# Patient Record
Sex: FEMALE | Race: WHITE | Employment: FULL TIME | URBAN - METROPOLITAN AREA
[De-identification: names, ages, dates, MRNs, and addresses within clinical notes are randomized per-mention and may not be internally consistent; named-entity substitution may affect disease eponyms.]

---

## 2017-08-24 ENCOUNTER — HOSPITAL ENCOUNTER (OUTPATIENT)
Dept: GENERAL RADIOLOGY | Age: 57
Discharge: HOME | End: 2017-08-24
Payer: COMMERCIAL

## 2017-08-24 ENCOUNTER — HOSPITAL ENCOUNTER (OUTPATIENT)
Dept: PREADMISSION TESTING | Age: 57
Discharge: HOME | End: 2017-08-24
Payer: COMMERCIAL

## 2017-08-24 VITALS — BODY MASS INDEX: 25.58 KG/M2 | WEIGHT: 139 LBS | HEIGHT: 62 IN

## 2017-08-24 LAB
ANION GAP SERPL CALC-SCNC: 8 MMOL/L (ref 3–18)
ATRIAL RATE: 65 BPM
BUN SERPL-MCNC: 13 MG/DL (ref 7–18)
BUN/CREAT SERPL: 23 (ref 12–20)
CALCIUM SERPL-MCNC: 8.7 MG/DL (ref 8.5–10.1)
CALCULATED P AXIS, ECG09: 64 DEGREES
CALCULATED R AXIS, ECG10: 40 DEGREES
CALCULATED T AXIS, ECG11: 58 DEGREES
CHLORIDE SERPL-SCNC: 105 MMOL/L (ref 100–108)
CO2 SERPL-SCNC: 29 MMOL/L (ref 21–32)
CREAT SERPL-MCNC: 0.56 MG/DL (ref 0.6–1.3)
DIAGNOSIS, 93000: NORMAL
ERYTHROCYTE [DISTWIDTH] IN BLOOD BY AUTOMATED COUNT: 13.1 % (ref 11.6–14.5)
GLUCOSE SERPL-MCNC: 87 MG/DL (ref 74–99)
HCT VFR BLD AUTO: 41.1 % (ref 35–45)
HGB BLD-MCNC: 13.5 G/DL (ref 12–16)
MCH RBC QN AUTO: 30.4 PG (ref 24–34)
MCHC RBC AUTO-ENTMCNC: 32.8 G/DL (ref 31–37)
MCV RBC AUTO: 92.6 FL (ref 74–97)
P-R INTERVAL, ECG05: 152 MS
PLATELET # BLD AUTO: 284 K/UL (ref 135–420)
PMV BLD AUTO: 8.7 FL (ref 9.2–11.8)
POTASSIUM SERPL-SCNC: 4.1 MMOL/L (ref 3.5–5.5)
Q-T INTERVAL, ECG07: 410 MS
QRS DURATION, ECG06: 74 MS
QTC CALCULATION (BEZET), ECG08: 426 MS
RBC # BLD AUTO: 4.44 M/UL (ref 4.2–5.3)
SODIUM SERPL-SCNC: 142 MMOL/L (ref 136–145)
VENTRICULAR RATE, ECG03: 65 BPM
WBC # BLD AUTO: 9 K/UL (ref 4.6–13.2)

## 2017-08-24 PROCEDURE — 80048 BASIC METABOLIC PNL TOTAL CA: CPT | Performed by: UROLOGY

## 2017-08-24 PROCEDURE — 36415 COLL VENOUS BLD VENIPUNCTURE: CPT | Performed by: UROLOGY

## 2017-08-24 PROCEDURE — 93005 ELECTROCARDIOGRAM TRACING: CPT

## 2017-08-24 PROCEDURE — 71010 XR CHEST SNGL V: CPT

## 2017-08-24 PROCEDURE — 85027 COMPLETE CBC AUTOMATED: CPT | Performed by: UROLOGY

## 2017-08-24 RX ORDER — CEFAZOLIN SODIUM 2 G/50ML
2 SOLUTION INTRAVENOUS ONCE
Status: CANCELLED | OUTPATIENT
Start: 2017-08-24 | End: 2017-08-24

## 2017-08-24 RX ORDER — SODIUM CHLORIDE, SODIUM LACTATE, POTASSIUM CHLORIDE, CALCIUM CHLORIDE 600; 310; 30; 20 MG/100ML; MG/100ML; MG/100ML; MG/100ML
125 INJECTION, SOLUTION INTRAVENOUS CONTINUOUS
Status: CANCELLED | OUTPATIENT
Start: 2017-08-24

## 2017-08-24 RX ORDER — METOPROLOL SUCCINATE 100 MG/1
100 TABLET, EXTENDED RELEASE ORAL DAILY
COMMUNITY

## 2017-08-31 ENCOUNTER — ANESTHESIA EVENT (OUTPATIENT)
Dept: SURGERY | Age: 57
End: 2017-08-31
Payer: COMMERCIAL

## 2017-09-01 ENCOUNTER — APPOINTMENT (OUTPATIENT)
Dept: GENERAL RADIOLOGY | Age: 57
End: 2017-09-01
Attending: UROLOGY
Payer: COMMERCIAL

## 2017-09-01 ENCOUNTER — ANESTHESIA (OUTPATIENT)
Dept: SURGERY | Age: 57
End: 2017-09-01
Payer: COMMERCIAL

## 2017-09-01 ENCOUNTER — HOSPITAL ENCOUNTER (OUTPATIENT)
Age: 57
Setting detail: OUTPATIENT SURGERY
Discharge: HOME | End: 2017-09-01
Attending: UROLOGY | Admitting: UROLOGY
Payer: COMMERCIAL

## 2017-09-01 VITALS
HEART RATE: 62 BPM | RESPIRATION RATE: 18 BRPM | DIASTOLIC BLOOD PRESSURE: 64 MMHG | HEIGHT: 62 IN | TEMPERATURE: 97.4 F | SYSTOLIC BLOOD PRESSURE: 148 MMHG | OXYGEN SATURATION: 98 % | WEIGHT: 142 LBS | BODY MASS INDEX: 26.13 KG/M2

## 2017-09-01 PROCEDURE — C1894 INTRO/SHEATH, NON-LASER: HCPCS | Performed by: UROLOGY

## 2017-09-01 PROCEDURE — 74011250636 HC RX REV CODE- 250/636: Performed by: UROLOGY

## 2017-09-01 PROCEDURE — 76210000020 HC REC RM PH II FIRST 0.5 HR: Performed by: UROLOGY

## 2017-09-01 PROCEDURE — 74011636320 HC RX REV CODE- 636/320: Performed by: UROLOGY

## 2017-09-01 PROCEDURE — 77030020782 HC GWN BAIR PAWS FLX 3M -B: Performed by: UROLOGY

## 2017-09-01 PROCEDURE — 74011250636 HC RX REV CODE- 250/636

## 2017-09-01 PROCEDURE — 77030018836 HC SOL IRR NACL ICUM -A: Performed by: UROLOGY

## 2017-09-01 PROCEDURE — 76010000138 HC OR TIME 0.5 TO 1 HR: Performed by: UROLOGY

## 2017-09-01 PROCEDURE — C1758 CATHETER, URETERAL: HCPCS | Performed by: UROLOGY

## 2017-09-01 PROCEDURE — 77030012508 HC MSK AIRWY LMA AMBU -A: Performed by: ANESTHESIOLOGY

## 2017-09-01 PROCEDURE — 74011000250 HC RX REV CODE- 250

## 2017-09-01 PROCEDURE — 76060000032 HC ANESTHESIA 0.5 TO 1 HR: Performed by: UROLOGY

## 2017-09-01 PROCEDURE — C2617 STENT, NON-COR, TEM W/O DEL: HCPCS | Performed by: UROLOGY

## 2017-09-01 PROCEDURE — C1769 GUIDE WIRE: HCPCS | Performed by: UROLOGY

## 2017-09-01 PROCEDURE — 74420 UROGRAPHY RTRGR +-KUB: CPT

## 2017-09-01 PROCEDURE — 76210000006 HC OR PH I REC 0.5 TO 1 HR: Performed by: UROLOGY

## 2017-09-01 DEVICE — URETERAL STENT
Type: IMPLANTABLE DEVICE | Site: URETER | Status: FUNCTIONAL
Brand: POLARIS™ ULTRA

## 2017-09-01 RX ORDER — ONDANSETRON 2 MG/ML
4 INJECTION INTRAMUSCULAR; INTRAVENOUS ONCE
Status: DISCONTINUED | OUTPATIENT
Start: 2017-09-01 | End: 2017-09-01 | Stop reason: HOSPADM

## 2017-09-01 RX ORDER — LIDOCAINE HYDROCHLORIDE 20 MG/ML
INJECTION, SOLUTION EPIDURAL; INFILTRATION; INTRACAUDAL; PERINEURAL AS NEEDED
Status: DISCONTINUED | OUTPATIENT
Start: 2017-09-01 | End: 2017-09-01 | Stop reason: HOSPADM

## 2017-09-01 RX ORDER — NALOXONE HYDROCHLORIDE 0.4 MG/ML
0.4 INJECTION, SOLUTION INTRAMUSCULAR; INTRAVENOUS; SUBCUTANEOUS AS NEEDED
Status: DISCONTINUED | OUTPATIENT
Start: 2017-09-01 | End: 2017-09-01 | Stop reason: HOSPADM

## 2017-09-01 RX ORDER — PHENAZOPYRIDINE HYDROCHLORIDE 200 MG/1
200 TABLET, FILM COATED ORAL 3 TIMES DAILY
Qty: 21 TAB | Refills: 0 | Status: SHIPPED | OUTPATIENT
Start: 2017-09-01 | End: 2017-09-08

## 2017-09-01 RX ORDER — SODIUM CHLORIDE, SODIUM LACTATE, POTASSIUM CHLORIDE, CALCIUM CHLORIDE 600; 310; 30; 20 MG/100ML; MG/100ML; MG/100ML; MG/100ML
125 INJECTION, SOLUTION INTRAVENOUS CONTINUOUS
Status: DISCONTINUED | OUTPATIENT
Start: 2017-09-01 | End: 2017-09-01 | Stop reason: HOSPADM

## 2017-09-01 RX ORDER — FENTANYL CITRATE 50 UG/ML
50 INJECTION, SOLUTION INTRAMUSCULAR; INTRAVENOUS
Status: DISCONTINUED | OUTPATIENT
Start: 2017-09-01 | End: 2017-09-01 | Stop reason: HOSPADM

## 2017-09-01 RX ORDER — CEPHALEXIN 500 MG/1
500 CAPSULE ORAL 2 TIMES DAILY
Qty: 6 CAP | Refills: 0 | Status: SHIPPED | OUTPATIENT
Start: 2017-09-01

## 2017-09-01 RX ORDER — PROPOFOL 10 MG/ML
INJECTION, EMULSION INTRAVENOUS AS NEEDED
Status: DISCONTINUED | OUTPATIENT
Start: 2017-09-01 | End: 2017-09-01 | Stop reason: HOSPADM

## 2017-09-01 RX ORDER — CEFAZOLIN SODIUM 2 G/50ML
2 SOLUTION INTRAVENOUS ONCE
Status: COMPLETED | OUTPATIENT
Start: 2017-09-01 | End: 2017-09-01

## 2017-09-01 RX ORDER — HYDROCODONE BITARTRATE AND ACETAMINOPHEN 5; 325 MG/1; MG/1
1 TABLET ORAL
Qty: 20 TAB | Refills: 0 | Status: SHIPPED | OUTPATIENT
Start: 2017-09-01

## 2017-09-01 RX ORDER — OXYCODONE AND ACETAMINOPHEN 5; 325 MG/1; MG/1
1 TABLET ORAL AS NEEDED
Status: DISCONTINUED | OUTPATIENT
Start: 2017-09-01 | End: 2017-09-01 | Stop reason: HOSPADM

## 2017-09-01 RX ORDER — EPHEDRINE SULFATE/0.9% NACL/PF 25 MG/5 ML
SYRINGE (ML) INTRAVENOUS AS NEEDED
Status: DISCONTINUED | OUTPATIENT
Start: 2017-09-01 | End: 2017-09-01 | Stop reason: HOSPADM

## 2017-09-01 RX ORDER — MIDAZOLAM HYDROCHLORIDE 1 MG/ML
INJECTION, SOLUTION INTRAMUSCULAR; INTRAVENOUS AS NEEDED
Status: DISCONTINUED | OUTPATIENT
Start: 2017-09-01 | End: 2017-09-01 | Stop reason: HOSPADM

## 2017-09-01 RX ORDER — ONDANSETRON 2 MG/ML
INJECTION INTRAMUSCULAR; INTRAVENOUS AS NEEDED
Status: DISCONTINUED | OUTPATIENT
Start: 2017-09-01 | End: 2017-09-01 | Stop reason: HOSPADM

## 2017-09-01 RX ORDER — FLUMAZENIL 0.1 MG/ML
0.2 INJECTION INTRAVENOUS
Status: DISCONTINUED | OUTPATIENT
Start: 2017-09-01 | End: 2017-09-01 | Stop reason: HOSPADM

## 2017-09-01 RX ORDER — SODIUM CHLORIDE, SODIUM LACTATE, POTASSIUM CHLORIDE, CALCIUM CHLORIDE 600; 310; 30; 20 MG/100ML; MG/100ML; MG/100ML; MG/100ML
100 INJECTION, SOLUTION INTRAVENOUS CONTINUOUS
Status: DISCONTINUED | OUTPATIENT
Start: 2017-09-01 | End: 2017-09-01 | Stop reason: HOSPADM

## 2017-09-01 RX ORDER — FENTANYL CITRATE 50 UG/ML
INJECTION, SOLUTION INTRAMUSCULAR; INTRAVENOUS AS NEEDED
Status: DISCONTINUED | OUTPATIENT
Start: 2017-09-01 | End: 2017-09-01 | Stop reason: HOSPADM

## 2017-09-01 RX ADMIN — FENTANYL CITRATE 25 MCG: 50 INJECTION, SOLUTION INTRAMUSCULAR; INTRAVENOUS at 09:23

## 2017-09-01 RX ADMIN — FENTANYL CITRATE 50 MCG: 50 INJECTION, SOLUTION INTRAMUSCULAR; INTRAVENOUS at 09:03

## 2017-09-01 RX ADMIN — SODIUM CHLORIDE, SODIUM LACTATE, POTASSIUM CHLORIDE, AND CALCIUM CHLORIDE 125 ML/HR: 600; 310; 30; 20 INJECTION, SOLUTION INTRAVENOUS at 08:16

## 2017-09-01 RX ADMIN — PROPOFOL 160 MG: 10 INJECTION, EMULSION INTRAVENOUS at 09:09

## 2017-09-01 RX ADMIN — MIDAZOLAM HYDROCHLORIDE 2 MG: 1 INJECTION, SOLUTION INTRAMUSCULAR; INTRAVENOUS at 09:03

## 2017-09-01 RX ADMIN — CEFAZOLIN SODIUM 2 G: 2 SOLUTION INTRAVENOUS at 09:03

## 2017-09-01 RX ADMIN — FENTANYL CITRATE 25 MCG: 50 INJECTION, SOLUTION INTRAMUSCULAR; INTRAVENOUS at 09:40

## 2017-09-01 RX ADMIN — Medication 10 MG: at 09:13

## 2017-09-01 RX ADMIN — ONDANSETRON 4 MG: 2 INJECTION INTRAMUSCULAR; INTRAVENOUS at 09:32

## 2017-09-01 RX ADMIN — LIDOCAINE HYDROCHLORIDE 60 MG: 20 INJECTION, SOLUTION EPIDURAL; INFILTRATION; INTRACAUDAL; PERINEURAL at 09:09

## 2017-09-01 RX ADMIN — SODIUM CHLORIDE, SODIUM LACTATE, POTASSIUM CHLORIDE, AND CALCIUM CHLORIDE: 600; 310; 30; 20 INJECTION, SOLUTION INTRAVENOUS at 09:51

## 2017-09-01 NOTE — H&P
H&P Update: Apple Zarate was seen and examined. History and physical has been reviewed. The patient has been examined.  There have been no significant clinical changes since the completion of the originally dated History and Physical.    Signed By: Keiko Bean MD     September 1, 2017 8:44 AM

## 2017-09-01 NOTE — PERIOP NOTES
TRANSFER - OUT REPORT:    Verbal report given to Shaquille Francois RN on Korin Grandchild  being transferred to Postop for routine progression of care       Report consisted of patients Situation, Background, Assessment and   Recommendations(SBAR). Information from the following report(s) SBAR, Kardex, OR Summary, Intake/Output, MAR and Recent Results was reviewed with the receiving nurse. Lines:   Peripheral IV 79/36/20 Left Cephalic (Active)   Site Assessment Clean, dry, & intact 9/1/2017 10:00 AM   Phlebitis Assessment 0 9/1/2017 10:00 AM   Infiltration Assessment 0 9/1/2017 10:00 AM   Dressing Status Clean, dry, & intact 9/1/2017 10:00 AM   Dressing Type Transparent 9/1/2017 10:00 AM   Hub Color/Line Status Pink; Infusing 9/1/2017 10:00 AM       Subcutaneous Infusion Line 01/19/15 Left Shoulder (Active)        Opportunity for questions and clarification was provided.       Patient transported with:   O2 @ 2 liters  Tech

## 2017-09-01 NOTE — ANESTHESIA POSTPROCEDURE EVALUATION
Post-Anesthesia Evaluation & Assessment    Visit Vitals    /62 (BP 1 Location: Right arm, BP Patient Position: At rest;Supine)    Pulse 69    Temp 36.5 °C (97.7 °F)    Resp 18    Ht 5' 2\" (1.575 m)    Wt 64.4 kg (142 lb)    SpO2 96%    BMI 25.97 kg/m2       Nausea/Vomiting: Controlled. Post-operative hydration adequate. Pain Scale 1: Numeric (0 - 10) (09/01/17 1030)  Pain Intensity 1: 0 (09/01/17 1030)   Managed    Pain score at or below stated goal level. Mental status & Level of consciousness: alert and oriented x 3    Neurological status: moves all extremities, sensation grossly intact    Pulmonary status: airway patent, adequate oxygenation. Complications related to anesthesia: none    Patient has met all PACU discharge requirements.       Max Arizmendi DO

## 2017-09-01 NOTE — DISCHARGE INSTRUCTIONS
2 [facility], Urology     [facility], [address] [facility], [address] [place] [address]  Office: [phone]  Fax:    [phone], left ureteroscopy laser litho, stent placement  __  Notify [facility] Urology IMMEDIATELY if any of the following occur:     You are unable to urinate. Urgency to urinate is not uncommon.   You find yourself urinating small frequent amounts associated with severe lower abdominal discomfort.   Bright red blood with clots in the urine. Some reddish urine is not uncommon and should be treated with increasing the amount of fluids you drink.   Temperature above 101.5° and / or chills.   You are nauseous and / or vomiting and you cannot hold down any fluids.   Your pain is not controlled with the pain medication prescribed. Special Considerations:      Do not drive for at least 24 hours after the procedure and until you are no longer taking narcotic pain medication and you are able to move and react without hesitation.  You may return to work in 24-48 hours.  _x_  Our office will call you to make an appointment on Tuesday for cysto, left stent removal.     Please contact [facility]. Urology at [phone] or go to the nearest Emergency Department / Urgent Care facility for any other medical questions or concerns.       DISCHARGE SUMMARY from Nurse    The following personal items are in your possession at time of discharge:    Dental Appliances: None  Visual Aid: Glasses, At home     Home Medications: None  Jewelry: None  Clothing: Sent home, Shirt, Pants, Footwear, Undergarments, Socks (to [facility])  Other Valuables: None             PATIENT INSTRUCTIONS:    After general anesthesia or intravenous sedation, for 24 hours or while taking prescription Narcotics:  · Limit your activities  · Do not drive and operate hazardous machinery  · Do not make important personal or business decisions  · Do  not drink alcoholic beverages  · If you have not urinated within 8 hours after discharge, please contact your surgeon on call. Report the following to your surgeon:  · Excessive pain, swelling, redness or odor of or around the surgical area  · Temperature over 100.5  · Nausea and vomiting lasting longer than 4 hours or if unable to take medications  · Any signs of decreased circulation or nerve impairment to extremity: change in color, persistent  numbness, tingling, coldness or increase pain  · Any questions        What to do at Home:  Recommended activity: Activity as tolerated and no driving for today, Ambulate in house, No lifting, Driving, or Strenuous exercise until advised and No driving while on analgesics. If you experience any of the following symptoms as above, please follow up with Dr. Eusebia Joseph. *  Please give a list of your current medications to your Primary Care Provider. *  Please update this list whenever your medications are discontinued, doses are      changed, or new medications (including over-the-counter products) are added. *  Please carry medication information at all times in case of emergency situations. These are general instructions for a healthy lifestyle:    No smoking/ No tobacco products/ Avoid exposure to second hand smoke    Surgeon General's Warning:  Quitting smoking now greatly reduces serious risk to your health. Obesity, smoking, and sedentary lifestyle greatly increases your risk for illness    A healthy diet, regular physical exercise & weight monitoring are important for maintaining a healthy lifestyle    You may be retaining fluid if you have a history of heart failure or if you experience any of the following symptoms:  Weight gain of 3 pounds or more overnight or 5 pounds in a week, increased swelling in our hands or feet or shortness of breath while lying flat in bed.   Please call your doctor as soon as you notice any of these symptoms; do not wait until your next office visit. Recognize signs and symptoms of STROKE:    F-face looks uneven    A-arms unable to move or move unevenly    S-speech slurred or non-existent    T-time-call 911 as soon as signs and symptoms begin-DO NOT go       Back to bed or wait to see if you get better-TIME IS BRAIN. Warning Signs of HEART ATTACK     Call 911 if you have these symptoms:   Chest discomfort. Most heart attacks involve discomfort in the center of the chest that lasts more than a few minutes, or that goes away and comes back. It can feel like uncomfortable pressure, squeezing, fullness, or pain.  Discomfort in other areas of the upper body. Symptoms can include pain or discomfort in one or both arms, the back, neck, jaw, or stomach.  Shortness of breath with or without chest discomfort.  Other signs may include breaking out in a cold sweat, nausea, or lightheadedness. Don't wait more than five minutes to call 911 - MINUTES MATTER! Fast action can save your life. Calling 911 is almost always the fastest way to get lifesaving treatment. Emergency Medical Services staff can begin treatment when they arrive -- up to an hour sooner than if someone gets to the hospital by car. The discharge information has been reviewed with the patient and caregiver. The patient and caregiver verbalized understanding. Discharge medications reviewed with the patient and caregiver and appropriate educational materials and side effects teaching were provided.       Patient armband removed and shredded

## 2017-09-01 NOTE — IP AVS SNAPSHOT
[address] [address] 
[phone] Patient: Castillo Cadena MRN: [id number] LWL:3/60/0298 You are allergic to the following Allergen Reactions Arthrotec 50 (Diclofenac-Misoprostol) Hives Itching Sulfa (Sulfonamide Antibiotics) Hives Itching Recent Documentation Height Weight BMI OB Status Smoking Status 1.575 m 64.4 kg 25.97 kg/m2 Hysterectomy Current Every Day Smoker Emergency Contacts Name Discharge Info Relation Home Work Mobile Siva Powervirginia Escobar CAREGIVER [3] Sister [23] [phone] Nirav Abreu NO [2] Son [22] [phone] About your hospitalization You were admitted on:  September 1, 2017 [facility] were discharged on:  September 1, 2017 Unit phone number:  [phone] Why you were hospitalized Your primary diagnosis was:  Not on File Providers Seen During Your Hospitalizations Provider Role Specialty Primary office phone Oliver Hernandez MD Attending Provider Urology [phone] Your Primary Care Physician (PCP) Primary Care Physician Office Phone Office Fax Nito Go [phone] Follow-up Information Follow up With Details Comments Contact Info Ana Madden MD   [place] [address] [address] [place] [address] 
[phone] Oliver Hernandez MD  office will call [address] [place] [address] [address] [place] [address] 
[phone] Current Discharge Medication List  
  
START taking these medications Dose & Instructions Dispensing Information Comments Morning Noon Evening Bedtime  
 cephALEXin 500 mg capsule Commonly known as:  Damion Saas Your last dose was: Your next dose is:    
   
   
 Dose:  500 mg Take 1 Cap by mouth two (2) times a day. Quantity:  6 Cap Refills:  0 HYDROcodone-acetaminophen 5-325 mg per tablet Commonly known as:  [address] Your last dose was: Your next dose is:    
   
   
 Dose:  1 Tab Take 1 Tab by mouth every four (4) hours as needed for Pain. Max Daily Amount: 6 Tabs. Quantity:  20 Tab Refills:  0 phenazopyridine 200 mg tablet Commonly known as:  PYRIDIUM Your last dose was: Your next dose is:    
   
   
 Dose:  200 mg Take 1 Tab by mouth three (3) times daily for 7 days. Quantity:  21 Tab Refills:  0 CONTINUE these medications which have NOT CHANGED Dose & Instructions Dispensing Information Comments Morning Noon Evening Bedtime ALLERGY-SINUS PO Your last dose was: Your next dose is:    
   
   
 Dose:  2 Tab Take 2 Tabs by mouth as needed. Refills:  0  
     
   
   
   
  
 amoxicillin 500 mg capsule Commonly known as:  AMOXIL Your last dose was: Your next dose is:    
   
   
 Dose:  500 mg Take 500 mg by mouth as needed. For dental work Refills:  0  
     
   
   
   
  
 atorvastatin 40 mg tablet Commonly known as:  LIPITOR Your last dose was: Your next dose is:    
   
   
 Dose:  40 mg Take 40 mg by mouth daily. Indications: HYPERCHOLESTEROLEMIA Refills:  0  
     
   
   
   
  
 buPROPion  mg SR tablet Commonly known as:  Antoine Cruz Your last dose was: Your next dose is:    
   
   
 Dose:  100 mg Take 100 mg by mouth daily. Refills:  0 LEXAPRO 20 mg tablet Generic drug:  escitalopram oxalate Your last dose was: Your next dose is:    
   
   
 Dose:  20 mg Take 20 mg by mouth daily. Indications: GENERALIZED ANXIETY DISORDER Refills:  0 LORazepam 1 mg tablet Commonly known as:  ATIVAN Your last dose was: Your next dose is: Dose:  1 mg Take 1 mg by mouth as needed for Anxiety. Refills:  0  
     
   
   
   
  
 multivitamin with iron tablet Your last dose was: Your next dose is:    
   
   
 Dose:  1 Tab Take 1 Tab by mouth daily. Refills:  0  
     
   
   
   
  
 omeprazole 20 mg capsule Commonly known as:  PRILOSEC Your last dose was: Your next dose is:    
   
   
 Dose:  20 mg Take 20 mg by mouth daily. Pt instructed to take with a small bit of water on DOS Refills:  0  
     
   
   
   
  
 TOPROL  mg tablet Generic drug:  metoprolol succinate Your last dose was: Your next dose is:    
   
   
 Dose:  100 mg Take 100 mg by mouth daily. Refills:  0 VALTREX 500 mg tablet Generic drug:  valACYclovir Your last dose was: Your next dose is:    
   
   
 Dose:  500 mg Take 500 mg by mouth as needed. Refills:  0  
     
   
   
   
  
 VITAMIN D2 50,000 unit capsule Generic drug:  ergocalciferol Your last dose was: Your next dose is:    
   
   
 Dose:  48508 Units Take 50,000 Units by mouth every fourteen (14) days. Refills:  0 Where to Get Your Medications Information on where to get these meds will be given to you by the nurse or doctor. ! Ask your nurse or doctor about these medications  
  cephALEXin 500 mg capsule HYDROcodone-acetaminophen 5-325 mg per tablet  
 phenazopyridine 200 mg tablet Discharge Instructions 2 [facility], Urology [facility], [address] [place] [address] Office: [phone] Fax:    [phone] PROCEDURE Cysto, left ureteroscopy laser litho, stent placement 
__ Notify [facility]. Urology IMMEDIATELY if any of the following occur: ? You are unable to urinate. Urgency to urinate is not uncommon. ?  You find yourself urinating small frequent amounts associated with severe lower abdominal discomfort. ?  Bright red blood with clots in the urine. Some reddish urine is not uncommon and should be treated with increasing the amount of fluids you drink. ? Temperature above 101.5° and / or chills. ?  You are nauseous and / or vomiting and you cannot hold down any fluids. ?  Your pain is not controlled with the pain medication prescribed. Special Considerations:  
 
? Do not drive for at least 24 hours after the procedure and until you are no longer taking narcotic pain medication and you are able to move and react without hesitation. ? You may return to work in 24-48 hours. ? _x_  Our office will call you to make an appointment on Tuesday for cysto, left stent removal.  
 
Please contact [facility]. Urology at [phone] or go to the nearest Emergency Department / Urgent Care facility for any other medical questions or concerns. DISCHARGE SUMMARY from Nurse The following personal items are in your possession at time of discharge: 
 
Dental Appliances: None Visual Aid: Glasses, At home Home Medications: None Jewelry: None Clothing: Sent home, Shirt, Pants, Footwear, Undergarments, Socks (to [place]) Other Valuables: None PATIENT INSTRUCTIONS: 
 
 
F-face looks uneven A-arms unable to move or move unevenly S-speech slurred or non-existent T-time-call 911 as soon as signs and symptoms begin-DO NOT go Back to bed or wait to see if you get better-TIME IS BRAIN.  
 
Warning Signs of HEART ATTACK  
 
 Call 911 if you have these symptoms: 
? Chest discomfort. Most heart attacks involve discomfort in the center of the chest that lasts more than a few minutes, or that goes away and comes back. It can feel like uncomfortable pressure, squeezing, fullness, or pain. ? Discomfort in other areas of the upper body. Symptoms can include pain or discomfort in one or both arms, the back, neck, jaw, or stomach. ? Shortness of breath with or without chest discomfort. ? Other signs may include breaking out in a cold sweat, nausea, or lightheadedness. Don't wait more than five minutes to call [address]! Fast action can save your life. Calling 911 is almost always the fastest way to get lifesaving treatment. Emergency Medical Services staff can begin treatment when they arrive  up to an hour sooner than if someone gets to the hospital by car. The discharge information has been reviewed with the patient and caregiver. The patient and caregiver verbalized understanding. Discharge medications reviewed with the patient and caregiver and appropriate educational materials and side effects teaching were provided. Patient armband removed and shredded Discharge Orders None Introducing [facility]! Savana Bradley introduces [employer] patient portal. Now you can access parts of your medical record, email your doctor's office, and request medication refills online. 1. In your internet browser, go to [email or web address] 2. Click on the First Time User? Click Here link in the Sign In box. You will see the New Member Sign Up page. 3. Enter your [employer] Access Code exactly as it appears below. You will not need to use this code after youve completed the sign-up process. If you do not sign up before the expiration date, you must request a new code. · [employer] Access Code: [email or web address] Expires: 11/20/2017  2:11 PM 
 
 4. Enter the last four digits of your Social Security Number (xxxx) and Date of Birth (mm/dd/yyyy) as indicated and click Submit. You will be taken to the next sign-up page. 5. Create a [employer] ID. This will be your [employer] login ID and cannot be changed, so think of one that is secure and easy to remember. 6. Create a [employer] password. You can change your password at any time. 7. Enter your Password Reset Question and Answer. This can be used at a later time if you forget your password. 8. Enter your e-mail address. You will receive e-mail notification when new information is available in [address] 9. Click Sign Up. You can now view and download portions of your medical record. 10. Click the Download Summary menu link to download a portable copy of your medical information. If you have questions, please visit the Frequently Asked Questions section of the [employer] website. Remember, [employer] is NOT to be used for urgent needs. For medical emergencies, dial 911. Now available from your iPhone and Android! General Information Please provide this summary of care documentation to your next provider. Patient Signature:  ____________________________________________________________ Date:  ____________________________________________________________  
  
[facility] Provider Signature:  ____________________________________________________________ Date:  ____________________________________________________________

## 2017-09-01 NOTE — BRIEF OP NOTE
BRIEF OPERATIVE NOTE    Date of Procedure: 9/1/2017   Preoperative Diagnosis: LEFT KIDNEY STONE  Postoperative Diagnosis: LEFT KIDNEY STONE    Procedure(s):  CYSTOSCOPY,LEFT RETROGRADE PYELOGRAM WITH INTERPERTATION,LEFT URETEROSCOPY,LASER LITHO W/HOLIMIUM,STENT PLACEMENT W/C-ARM  Surgeon(s) and Role:     * She Carrasquillo MD - Primary         Assistant Staff:       Surgical Staff:  Circ-1: Ramon Orlando  Circ-2: Yasmine Evans RN  Radiology Technician: Onesimo Arita  Scrub Tech-1: Pranav Cifuentes  Event Time In   Incision Start 0919   Incision Close 8452     Anesthesia: General   Estimated Blood Loss: minimal  Specimens: * No specimens in log *   Findings: left UPJ stone about 2cm. Complications: None  Implants:   Implant Name Type Inv.  Item Serial No.  Lot No. LRB No. Used Action   Veta Severance DBL-PGTL [id number] Chadjeannette Miriamelsie - [id number]   Deniseta Severance DBL-PGTL [id number] -- Whitley Herrera [facility] UROLOGY-WOMENS [facility] [id number] Left 1 Implanted

## 2017-09-01 NOTE — OP NOTES
Date of Procedure: 9/1/2017   Preoperative Diagnosis: LEFT KIDNEY STONE  Postoperative Diagnosis: LEFT KIDNEY STONE    Procedure(s):  CYSTOSCOPY,LEFT RETROGRADE PYELOGRAM WITH INTERPERTATION,LEFT URETEROSCOPY,LASER LITHO W/HOLIMIUM,STENT PLACEMENT W/C-ARM  Surgeon(s) and Role:     * She Carrasquillo MD - Primary         Assistant Staff:       Surgical Staff:  Circ-1: Ernestina Cha  Circ-2: Wagner Hernandez RN  Radiology Technician: [facility]: "[employer]  Event Time In   Incision Start 0919   Incision Close 3404     Anesthesia: General   Estimated Blood Loss: minimal  Specimens: * No specimens in log *   Findings: left UPJ stone about 2cm. Complications: None  Implants:   Implant Name Type Inv. Item Serial No.  Lot No. LRB No. Used Action   STENT URET DBL-PGTL [id number] Salvador Velez DBL-PGTL [id number] -- POLARIS   [facility] [id number] Left 1 Implanted       PROCEDURE:  On the day of operation, the patient was explained and understood all the risks, benefits, and alternatives of the procedure were explained to her and she was then brought to the operative theater and placed on the table in a supine position. General anesthetic was administered. Patient received antibiotics prior to the beginning of procedure. A time-out was performed. She was moved into the dorsal lithotomy position, and prepped and draped in a usual sterile fashion. We then inserted a 21-Slovenian cystoscope with a 30 degree lens in an atraumatic fashion into the patient's bladder. The urethra was normal. Bladder was inspected and was normal . We then identified the left ureteral orifice. An open ended catheter was then used to perform retrograde pyelogram with 10 ml of visipaque contrast dye which showed normal caliber ureters and mild to moderate left hydronephrosis. Filling defects were seen in the left UPJ consistent with the stone.  A sensor wire was placed in to the renal pelvis and a dual lumen was used to placed a second wire. The ureteral access sheath was placed over the working wire. The other wire was used as a safety. The flexible ureteroscope was place via the access sheath to the level of the left UPJ. The stone was treated with 272 micro laser to small tiny fragments less than 1-2mm. The flexible ureteroscope along with the sheath was removed visually. A stent was placed as above in the standard fashion. The curl in kidney and bladder were noted fluoroscopically and visually, respectively. The bladder was drained and the transferred to the supine position extubated and transported to the PACU in stable condition. Retrograde pyelogram w/ interpretation:   Left retrograde pyelogram with 10 ml of visipaque contrast dye which showed normal caliber ureters and mild to moderate left hydronephrosis. Filling defects were seen in the left UPJ consistent with the stone.

## 2017-09-01 NOTE — PERIOP NOTES
TRANSFER - IN REPORT:    Verbal report received from ORN and CRNA on Luz Luciano  being received from OR for routine progression of care      Report consisted of patients Situation, Background, Assessment and   Recommendations(SBAR). Information from the following report(s) SBAR, Kardex, ED Summary, Intake/Output, MAR and Recent Results was reviewed with the receiving nurse. Opportunity for questions and clarification was provided. Assessment completed upon patients arrival to unit and care assumed.

## 2017-09-01 NOTE — ANESTHESIA PREPROCEDURE EVALUATION
Anesthetic History     PONV   Pertinent negatives: No increased risk of difficult airway, pseudocholinesterase deficiency, malignant hyperthermia and history of awareness of surgery under anesthesia       Review of Systems / Medical History  Patient summary reviewed, nursing notes reviewed and pertinent labs reviewed    Pulmonary        Sleep apnea: No treatment  Smoker    Pertinent negatives: No COPD, asthma and recent URI  Comments: Abstained from smoking DOS   Neuro/Psych         Psychiatric history  Pertinent negatives: No seizures, neuromuscular disease, TIA and CVA   Cardiovascular    Hypertension: well controlled          Hyperlipidemia  Pertinent negatives: No past MI, CAD, PAD, dysrhythmias, angina and CHF  Exercise tolerance: >4 METS     GI/Hepatic/Renal     GERD: well controlled    Renal disease: stones  PUD  Pertinent negatives: No hepatitis and liver disease   Endo/Other        Arthritis  Pertinent negatives: No diabetes, hypothyroidism, hyperthyroidism, obesity and blood dyscrasia   Other Findings            Physical Exam    Airway  Mallampati: II  TM Distance: 4 - 6 cm  Neck ROM: decreased range of motion   Mouth opening: Normal     Cardiovascular  Regular rate and rhythm,  S1 and S2 normal,  no murmur, click, rub, or gallop             Dental  No notable dental hx       Pulmonary  Breath sounds clear to auscultation               Abdominal  GI exam deferred       Other Findings            Anesthetic Plan    ASA: 2  Anesthesia type: general          Induction: Intravenous  Anesthetic plan and risks discussed with: Patient and Family      GA/LMA

## 2017-09-01 NOTE — H&P
Urology [address]. [address], [address] [facility]  phone: [phone]  fax: [phone]          Patient: Jeremy Bermeo  YOB: 1960  Date: 08/21/2017 10:45 AM   Visit Type: Consult      Completed Orders (this encounter)  Order Interpretation Result Next Lab Date   URINALYSIS, AUTO, W/O SCOPE see detail Test 1Color: yellow. Clarity: clear. Glucose: normal.  Bilirubin: negative. Ketones: negative. Specific Gravity: 1.005. Blood: negative. pH: 7.0. Protein: negative. Urobilinogen: normal.  Nitrite: negative. Leukocytes: negative. Assessment/Plan  # Detail Type Description    1. Assessment Calculus of kidney (N20.0). Patient Plan Pt with left renal pelvic stone 1.9cm and intermittenly symptomatic. We discussed the treatment options for this as pt does need treatment. I reviewed ESWL, ureteroscopy,[facility]. At the end of the completion risks, benefits and the alternatives for each we elected for left cysto ureteroscopy laser lithotripsy and stent insertion. I reviewed all the risks and benefits for each procedure  especially ureteroscopy and she understands and wishes to proceed. She will be schd at [facility] 9/1/17. Plan Orders The patient had the following test(s) completed today URINALYSIS, AUTO, W/O SCOPE. This 62year old  patient was referred by Kary Alas. This 62year old female presents for Kidney and/or Ureteral Stone. History of Present Illness:  1. Kidney and/or Ureteral Stone      Onset was gradual. Severity level is 7. It occurs intermittently. The problem is worse. Location of pain is left flank. The pain radiates to the abdomen. Prior stone type of unknown. Pertinent history includes history of prior stone(s). Relieving factors include analgesics. Associated symptoms include hematuria (gross).  Pertinent negatives include chills, constipation, diarrhea, fever, nausea, urinary blood clots, dribbling (urinary), frequency (urinary), urinary straining and vomiting. Additional information: Pt had renal US 8/16/17 showed a 1.9cm left renal pelvic stone. Intermittent pain. Occ nausea. 8/1/17 urine culture neg. Chrissie Vidal Performed Test Interpretation Result   08/21/2017 URINALYSIS, AUTO, W/O SCOPE see detail Test 1Color: yellow. Clarity: clear. Glucose: normal.  Bilirubin: negative. Ketones: negative. Specific Gravity: 1.005. Blood: negative. pH: 7.0. Protein: negative. Urobilinogen: normal.  Nitrite: negative. Leukocytes: negative. PAST MEDICAL/SURGICAL HISTORY   (Detailed)    Disease/disorder Onset Date Management Date Comments     left shoulder surgery 01/19/2015    kidney stones         GYNECOLOGIC HISTORY:  Patient is premenopausal.       PROBLEM LIST:  Problem Description Onset Date   Anxiety state 06/06/2014   Depressive disorder 06/06/2014   Gastroesophageal reflux disease 06/06/2014   Osteoporosis 06/06/2014   Hypercholesterolaemia 03/17/2015     Patient Status   Completed with information received for patient in a summary of care record. Medication Reconciliation  Medications reconciled today.   Medication Reviewed  Adherence Medication Name Sig Desc Elsewhere Status   taking as directed Vitamin D (Ergocalciferol) Oral Capsule 55273 UNIT TAKE 1 CAPSULE BY MOUTH ONCE EVERY 2 WEEK N Verified   taking as directed omeprazole 20 mg capsule,delayed release TAKE 1 CAPSULE BY MOUTH EVERY DAY 30 MINUTES TO 1 HOUR BEFORE A MEAL N Verified   taking as directed atorvastatin 40 mg tablet take 1 tablet by oral route  every day N Verified   taking as directed Lexapro 20 mg tablet take 1 tablet by oral route  every day N Verified   taking as directed metoprolol succinate  mg tablet,extended release 24 hr take 1 tablet by oral route  every day N Verified   taking as directed tizanidine 4 mg tablet take 1 tablet by oral route  every 12 hours as needed not to exceed 3 doses in 24 hours N Verified   taking as directed Wellbutrin  mg 24 hr tablet, extended release take 1 tablet by oral route  every day N Verified   taking as directed lorazepam 1 mg tablet take 1 tablet by oral route  every bedtime as needed N Verified   taking as directed tramadol 50 mg tablet take 1 tablet by oral route  every 6 hours as needed for pain N Verified     Allergies  Ingredient Reaction Medication Name Comment   SULFA (SULFONAMIDE ANTIBIOTICS)        (Reviewed, no changes.)  Family History:  (Detailed)  Relationship Family Member Name  Age at Death Condition Onset Age Cause of Death   Brother    Alcoholism  N   Father    Heart disease  N   Father    Hypertension  N   Father    Gout  N   Mother    Thyroid disorder  N   Mother    Osteoporosis  N   Mother    Depression  N   Sister    Diabetes mellitus  N   Sister    Depression  N         Social History:  (Detailed)  Tobacco use reviewed. Preferred language is [place]. MARITAL STATUS/FAMILY/SOCIAL SUPPORT  Currently . Tobacco use status: Occasional cigarette smoker. Smoking status: Current every day smoker. SMOKING STATUS  Use Status Type Smoking Status Usage Per Day Years Used Total Pack Years   yes Cigarette Current every day smoker          ALCOHOL  There is a history of alcohol use. Type: Wine. consumed rarely. CAFFEINE  The patient uses caffeine: coffee. Review of Systems  System Neg/Pos Details   Constitutional Negative Chills and fever. ENMT Negative Ear infections and sore throat. Eyes Negative Blurred vision, double vision and eye pain. Respiratory Negative Asthma, chronic cough, dyspnea and wheezing. Cardio Negative Chest pain. GI Negative Constipation, decreased appetite, diarrhea, nausea and vomiting.  Positive Hematuria.  Negative Urinary blood clots, urinary dribbling, urinary frequency and urinary straining. Endocrine Negative Cold intolerance, heat intolerance, increased thirst and weight loss.    Neuro Negative Headache and tremors. Psych Negative Anxiety and depression. Integumentary Negative Itching skin and rash. MS Negative Back pain and joint pain. Hema/Lymph Negative Easy bleeding. Reproductive Positive The patient is pre-menopausal.          Vital Signs     Gynecologic History  Patient is premenopausal.      Height  Time ft in cm Last Measured Height Position   10:43 AM 5.0 1.00 154.94 08/01/2017 Standing     Weight/BSA/BMI  Time lb oz kg Context BMI kg/m2 BSA m2   10:43 .00  61.235  25.51      Blood Pressure  Time BP mm/Hg Position Side Site Method Cuff Size   10:43 /93          Temperature/Pulse/Respiration  Time Temp F Temp C Temp Site Pulse/min Pattern Resp/ min   10:43 AM 97.70 36.50  80       Measured By  Time Measured by   10:43 AM Alex Anne       Physical Exam  Exam Findings Details   Constitutional Normal Well developed. Head/Face Normal Facial features - Normal.   Nose/Mouth/Throat Normal Tongue - Normal. Buccal mucosa - Normal.   Neck Exam Normal Inspection - Normal. Palpation - Normal. Thyroid gland - Normal.   Lymph Detail Normal Submandibular. Supraclavicular. Respiratory Normal Effort - Normal.   Abdomen Normal No abdominal tenderness. No hepatic enlargement. No spleen enlargement. No hernia. Genitourinary Normal No Suprapubic tenderness. No CVA tenderness. No Flank mass   Skin Normal Inspection - Normal.   Musculoskeletal Normal Visual overview of all four extremities is normal. Gait - Normal.   Extremity Normal No Edema. No Calf tenderness. Neurological Normal Level of consciousness - Normal. Orientation - Normal. Memory - Normal.   Psychiatric Normal Orientation - Oriented to time, place, person & situation. Appropriate mood and affect.          Medications (added, continued, or stopped today)  Started Medication Directions Instruction Stopped   06/02/2017 atorvastatin 40 mg tablet take 1 tablet by oral route  every day     06/02/2017 Lexapro 20 mg tablet take 1 tablet by oral route  every day     08/11/2017 lorazepam 1 mg tablet take 1 tablet by oral route  every bedtime as needed     06/27/2017 Macrobid 100 mg capsule take 1 capsule by oral route  every 12 hours with food  08/21/2017 06/02/2017 metoprolol succinate  mg tablet,extended release 24 hr take 1 tablet by oral route  every day     05/08/2017 omeprazole 20 mg capsule,delayed release TAKE 1 CAPSULE BY MOUTH EVERY DAY 30 MINUTES TO 1 HOUR BEFORE A MEAL     08/01/2017 tizanidine 4 mg tablet take 1 tablet by oral route  every 12 hours as needed not to exceed 3 doses in 24 hours     08/18/2017 tramadol 50 mg tablet take 1 tablet by oral route  every 6 hours as needed for pain     01/05/2017 Vitamin D (Ergocalciferol) Oral Capsule 33671 UNIT TAKE 1 CAPSULE BY MOUTH ONCE EVERY 2 WEEK     08/11/2017 Wellbutrin  mg 24 hr tablet, extended release take 1 tablet by oral route  every day     Completed by:         She Carrasquillo  08/23/2017 10:43 AM   Document generated by:  Geraldine Carrasquillo 08/23/2017 10:43 AM  CC Providers:  Negar Davey   [address] [address]  [phone]    [id number] [facility]   [address]  [phone]

## (undated) DEVICE — CATHETER URET L70CM OD6FR OPN END FLEXIMA

## (undated) DEVICE — DRAPE XR C ARM 41X74IN LF --

## (undated) DEVICE — URETERAL ACCESS SHEATH SET: Brand: NAVIGATOR HD

## (undated) DEVICE — SOLUTION IRRIG 3000ML 0.9% SOD CHL FLX CONT 0797208] ICU MEDICAL INC]

## (undated) DEVICE — DEVON™ KNEE AND BODY STRAP 60" X 3" (1.5 M X 7.6 CM): Brand: DEVON

## (undated) DEVICE — CYSTO PACK: Brand: MEDLINE INDUSTRIES, INC.

## (undated) DEVICE — TRAP MUCUS SPECIMEN 40ML -- MEDICHOICE

## (undated) DEVICE — DUAL LUMEN URETERAL CATHETER

## (undated) DEVICE — GDWIRE 3CM FLX-TIP 0.038X150CM -- BX/5 SENSOR

## (undated) DEVICE — BAG PRESSURE INFUSION 3 W STOPCOCK 3000 CC PREMIERPRO DISP